# Patient Record
Sex: FEMALE | Race: ASIAN | NOT HISPANIC OR LATINO | Employment: STUDENT | URBAN - METROPOLITAN AREA
[De-identification: names, ages, dates, MRNs, and addresses within clinical notes are randomized per-mention and may not be internally consistent; named-entity substitution may affect disease eponyms.]

---

## 2020-01-14 ENCOUNTER — OFFICE VISIT (OUTPATIENT)
Dept: NEUROLOGY | Facility: CLINIC | Age: 25
End: 2020-01-14
Payer: COMMERCIAL

## 2020-01-14 VITALS
WEIGHT: 111 LBS | HEART RATE: 66 BPM | DIASTOLIC BLOOD PRESSURE: 72 MMHG | SYSTOLIC BLOOD PRESSURE: 106 MMHG | HEIGHT: 62 IN | BODY MASS INDEX: 20.43 KG/M2

## 2020-01-14 DIAGNOSIS — G43.709 CHRONIC MIGRAINE WITHOUT AURA WITHOUT STATUS MIGRAINOSUS, NOT INTRACTABLE: Primary | ICD-10-CM

## 2020-01-14 DIAGNOSIS — G44.52 NEW PERSISTENT DAILY HEADACHE: ICD-10-CM

## 2020-01-14 DIAGNOSIS — E55.9 VITAMIN D DEFICIENCY: ICD-10-CM

## 2020-01-14 PROCEDURE — 99205 OFFICE O/P NEW HI 60 MIN: CPT | Performed by: PSYCHIATRY & NEUROLOGY

## 2020-01-14 RX ORDER — SUMATRIPTAN 100 MG/1
100 TABLET, FILM COATED ORAL ONCE AS NEEDED
Qty: 10 TABLET | Refills: 1 | Status: SHIPPED | OUTPATIENT
Start: 2020-01-14

## 2020-01-14 RX ORDER — RIBOFLAVIN (VITAMIN B2) 100 MG
50 TABLET ORAL 2 TIMES DAILY
Qty: 60 TABLET | Refills: 3 | Status: SHIPPED | OUTPATIENT
Start: 2020-01-14 | End: 2020-06-04 | Stop reason: CLARIF

## 2020-01-14 RX ORDER — ERGOCALCIFEROL 1.25 MG/1
CAPSULE ORAL
COMMUNITY
Start: 2019-10-07

## 2020-01-14 RX ORDER — CALCIUM CARBONATE 300MG(750)
400 TABLET,CHEWABLE ORAL 2 TIMES DAILY
Qty: 60 TABLET | Refills: 3 | Status: SHIPPED | OUTPATIENT
Start: 2020-01-14 | End: 2020-07-30 | Stop reason: ALTCHOICE

## 2020-01-14 NOTE — ASSESSMENT & PLAN NOTE
-Treatment - will start Vitamin B2, magnesium, feverfew, butterbur extract, side effects reviewed    Continue Abortive therapy with imitrex  Will get MRI brain without contrast since she does have L sided tingling/numbness, and floaters in her eyes, both of which are new symptoms she states  Counseling -  I Counseled the patient regarding avoiding taking analgesics no more than 3 times a week to avoid rebound headaches   Avoid caffeine intake after 3pm, and no more two cups a day   I also counseled patient to avoid migraine triggers if possible  I recommended maintaining regular sleep routine and diet regimen  I also advised patient to maintain a headache diary

## 2020-01-29 ENCOUNTER — HOSPITAL ENCOUNTER (OUTPATIENT)
Dept: RADIOLOGY | Facility: HOSPITAL | Age: 25
Discharge: HOME | End: 2020-01-29
Attending: PSYCHIATRY & NEUROLOGY
Payer: COMMERCIAL

## 2020-01-29 DIAGNOSIS — G43.709 CHRONIC MIGRAINE WITHOUT AURA WITHOUT STATUS MIGRAINOSUS, NOT INTRACTABLE: ICD-10-CM

## 2020-01-29 DIAGNOSIS — G44.52 NEW PERSISTENT DAILY HEADACHE: ICD-10-CM

## 2020-01-29 PROCEDURE — 70551 MRI BRAIN STEM W/O DYE: CPT

## 2020-04-06 ENCOUNTER — TELEPHONE (OUTPATIENT)
Dept: NEUROLOGY | Facility: CLINIC | Age: 25
End: 2020-04-06

## 2020-04-08 ENCOUNTER — TRANSCRIBE ORDERS (OUTPATIENT)
Dept: NEUROLOGY | Facility: CLINIC | Age: 25
End: 2020-04-08

## 2020-04-08 ENCOUNTER — TELEMEDICINE (OUTPATIENT)
Dept: NEUROLOGY | Facility: CLINIC | Age: 25
End: 2020-04-08
Payer: COMMERCIAL

## 2020-04-08 DIAGNOSIS — G44.52 NEW PERSISTENT DAILY HEADACHE: ICD-10-CM

## 2020-04-08 DIAGNOSIS — G44.52 NEW DAILY PERSISTENT HEADACHE (NDPH): ICD-10-CM

## 2020-04-08 DIAGNOSIS — G43.709 CHRONIC MIGRAINE WITHOUT AURA, NOT INTRACTABLE, WITHOUT STATUS MIGRAINOSUS: Primary | ICD-10-CM

## 2020-04-08 DIAGNOSIS — G43.709 CHRONIC MIGRAINE WITHOUT AURA WITHOUT STATUS MIGRAINOSUS, NOT INTRACTABLE: Primary | ICD-10-CM

## 2020-04-08 PROCEDURE — 99441 PR PHYS/QHP TELEPHONE EVALUATION 5-10 MIN: CPT | Performed by: PSYCHIATRY & NEUROLOGY

## 2020-05-27 ENCOUNTER — TELEPHONE (OUTPATIENT)
Dept: NEUROLOGY | Facility: CLINIC | Age: 25
End: 2020-05-27

## 2020-05-28 ENCOUNTER — TELEPHONE (OUTPATIENT)
Dept: NEUROLOGY | Facility: CLINIC | Age: 25
End: 2020-05-28

## 2020-06-04 ENCOUNTER — TELEPHONE (OUTPATIENT)
Dept: NEUROLOGY | Facility: CLINIC | Age: 25
End: 2020-06-04

## 2020-06-04 ENCOUNTER — TELEMEDICINE (OUTPATIENT)
Dept: NEUROLOGY | Facility: CLINIC | Age: 25
End: 2020-06-04
Payer: COMMERCIAL

## 2020-06-04 DIAGNOSIS — G44.52 NEW PERSISTENT DAILY HEADACHE: ICD-10-CM

## 2020-06-04 DIAGNOSIS — G43.709 CHRONIC MIGRAINE WITHOUT AURA WITHOUT STATUS MIGRAINOSUS, NOT INTRACTABLE: Primary | ICD-10-CM

## 2020-06-04 DIAGNOSIS — G44.52 NEW DAILY PERSISTENT HEADACHE (NDPH): ICD-10-CM

## 2020-06-04 PROCEDURE — 99442 PR PHYS/QHP TELEPHONE EVALUATION 11-20 MIN: CPT | Performed by: PSYCHIATRY & NEUROLOGY

## 2020-06-04 RX ORDER — RIBOFLAVIN (VITAMIN B2) 100 MG
50 TABLET ORAL 2 TIMES DAILY
Qty: 60 TABLET | Refills: 3 | Status: SHIPPED | OUTPATIENT
Start: 2020-06-04

## 2020-06-18 ENCOUNTER — TELEPHONE (OUTPATIENT)
Dept: NEUROLOGY | Facility: CLINIC | Age: 25
End: 2020-06-18

## 2020-07-30 ENCOUNTER — OFFICE VISIT (OUTPATIENT)
Dept: NEUROLOGY | Facility: CLINIC | Age: 25
End: 2020-07-30
Payer: COMMERCIAL

## 2020-07-30 VITALS
HEIGHT: 62 IN | BODY MASS INDEX: 19.47 KG/M2 | DIASTOLIC BLOOD PRESSURE: 59 MMHG | TEMPERATURE: 98.4 F | HEART RATE: 85 BPM | SYSTOLIC BLOOD PRESSURE: 102 MMHG | WEIGHT: 105.8 LBS

## 2020-07-30 DIAGNOSIS — G43.709 CHRONIC MIGRAINE WITHOUT AURA WITHOUT STATUS MIGRAINOSUS, NOT INTRACTABLE: Primary | ICD-10-CM

## 2020-07-30 PROCEDURE — 99214 OFFICE O/P EST MOD 30 MIN: CPT | Performed by: PSYCHIATRY & NEUROLOGY

## 2020-07-30 NOTE — PROGRESS NOTES
Patient ID: Latoya Garcia is a 22 y o  female  Assessment/Plan: This is a 23 y/o  Female who is here as a follow up for chronic migraines without aura  PLAN:      Diagnoses and all orders for this visit:    Chronic migraine without aura without status migrainosus, not intractable  Preventive medications - continue with riboflavin and magnesium  Patient stopped taking vitamins for some reason because she got something in the mail about advising her to stop taking her vitamins  She did notice that her headaches are now back because she stopped taking her vitamins   -Failed previous prophylactic medications  -  none    Abortive medications - continue with imitrex PRN     Botox -not necessary at this time  Counseling / Headache management instructions-  - When patient has a moderate to severe headache, they should seek rest, initiate relaxation and apply cold compresses to the head  - Maintain regular sleep schedule  Adults need at least 7-8 hours of uninterrupted a night  - Limit over the counter medications such as Tylenol, Ibuprofen, Aleve, Excedrin  (No more than 3 times a week)  - Maintain headache diary  We discussed an YAMILE for a smart phone is "Migraine kelle"  - Limit caffeine to 1-2 cups 8 to 16 oz a day or less, none after 3pm   - Avoid dietary trigger  (aged cheese, peanuts, MSG, aspartame and nitrates)  - Patient is to have regular frequent meals to prevent headache onset  - Please drink at least 64 ounces of water a day to help remain hydrated  Follow up - 3 months  I would be happy to see the patient sooner if any new questions/concerns arise  Patient/Guardian was advised to the call the office if they have any questions and concerns in the meantime       Patient/Guardian does understand that if they have any new stroke like symptoms such as facial droop on one side, weakness/paralysis on either side, speech trouble, numbness on one side, balance issues, any vision changes, extreme dizziness or any new headache, to call 9-1-1 immediately or to proceed to the nearest ER immediately  Subjective:    HPI    This is a 21 y/o Female who is here as a follow up for chronic migraine  She does have migraines and she states that there worsened there on the left side of her head and have return but did not last long  She does have Imitrex prescribed but she only uses it on for extreme case she states  A scale of 1-10 her headaches are 4/10  Description of her pain is pressure, squeezing, pounding, throbbing, sharp  Pain is located mostly on the left temple and occasionally back of the head  On the average headaches are about 8-10 per month now  Headaches last about 30 minutes  Associated symptoms or headaches or spots before the eyes and light sensitivity and sometimes noise sensitivity  The following portions of the patient's history were reviewed and updated as appropriate:   She  has a past medical history of Ovarian cyst   She   Patient Active Problem List    Diagnosis Date Noted    New daily persistent headache (ndph) 04/08/2020    Chronic migraine without aura without status migrainosus, not intractable 01/14/2020    Vitamin D deficiency 01/14/2020    New persistent daily headache 01/14/2020     She  has no past surgical history on file  Her family history is not on file  She  reports that she has never smoked  She has never used smokeless tobacco  She reports that she does not drink alcohol or use drugs  Current Outpatient Medications   Medication Sig Dispense Refill    ergocalciferol (VITAMIN D2) 50,000 units TAKE 1 CAPSULE BY MOUTH EVERY TWO WEEKS      multivitamin (THERAGRAN) TABS Take 1 tablet by mouth daily        SUMAtriptan (IMITREX) 100 mg tablet Take 1 tablet (100 mg total) by mouth once as needed for migraine for up to 20 doses 10 tablet 1    Riboflavin (VITAMIN B-2) 100 MG TABS Take 1 tablet (100 mg total) by mouth daily (Patient not taking: Reported on 7/30/2020) 30 tablet 3    Riboflavin (VITAMIN B-2) 50 MG TABS Take 1 tablet (50 mg total) by mouth 2 (two) times a day (Patient not taking: Reported on 7/30/2020) 60 tablet 3     No current facility-administered medications for this visit  Current Outpatient Medications on File Prior to Visit   Medication Sig    ergocalciferol (VITAMIN D2) 50,000 units TAKE 1 CAPSULE BY MOUTH EVERY TWO WEEKS    multivitamin (THERAGRAN) TABS Take 1 tablet by mouth daily   SUMAtriptan (IMITREX) 100 mg tablet Take 1 tablet (100 mg total) by mouth once as needed for migraine for up to 20 doses    Riboflavin (VITAMIN B-2) 100 MG TABS Take 1 tablet (100 mg total) by mouth daily (Patient not taking: Reported on 7/30/2020)    Riboflavin (VITAMIN B-2) 50 MG TABS Take 1 tablet (50 mg total) by mouth 2 (two) times a day (Patient not taking: Reported on 7/30/2020)    [DISCONTINUED] Magnesium 400 MG TABS Take 1 tablet (400 mg total) by mouth 2 (two) times a day (Patient not taking: Reported on 7/30/2020)     No current facility-administered medications on file prior to visit  She is allergic to penicillins            Objective:    Blood pressure 102/59, pulse 85, temperature 98 4 °F (36 9 °C), temperature source Tympanic, height 5' 2" (1 575 m), weight 48 kg (105 lb 12 8 oz), not currently breastfeeding  Physical Exam  General - Patient is alert, awake, oriented to time, place and person, follows commands  Speech - no dysarthria noted, no aphasia noted  Neuro:   Cranial nerves: PERRL, EOMI, facial sensation intact, able to raise eyebrows symmetrically, cannot assess facial droop and tongue deviation due to face mask requirement  Motor: 5/5 throughout, normal tone, no pronator drift noted  Sensory - intact to soft touch throughout  Coordination - no ataxia/dysmetria noted  Gait - normal tandem walk without     ROS:  I personally reviewed ROS   Review of Systems   Constitutional: Negative  Negative for appetite change and fever  HENT: Negative  Negative for hearing loss, tinnitus, trouble swallowing and voice change  Eyes: Negative  Negative for photophobia and pain  Respiratory: Negative  Negative for shortness of breath  Cardiovascular: Negative  Negative for palpitations  Gastrointestinal: Negative  Negative for nausea and vomiting  Endocrine: Negative  Negative for cold intolerance  Genitourinary: Negative  Negative for dysuria, frequency and urgency  Musculoskeletal: Negative  Negative for myalgias and neck pain  Skin: Negative  Negative for rash  Neurological: Positive for headaches  Negative for dizziness, tremors, seizures, syncope, facial asymmetry, speech difficulty, weakness, light-headedness and numbness  Patient stated that she has two to three headaches a week now  Hematological: Negative  Does not bruise/bleed easily  Psychiatric/Behavioral: Negative  Negative for confusion, hallucinations and sleep disturbance

## 2021-04-13 DIAGNOSIS — Z23 ENCOUNTER FOR IMMUNIZATION: ICD-10-CM

## 2021-04-24 ENCOUNTER — IMMUNIZATIONS (OUTPATIENT)
Dept: FAMILY MEDICINE CLINIC | Facility: HOSPITAL | Age: 26
End: 2021-04-24

## 2021-04-24 DIAGNOSIS — Z23 ENCOUNTER FOR IMMUNIZATION: Primary | ICD-10-CM

## 2021-04-24 PROCEDURE — 0001A SARS-COV-2 / COVID-19 MRNA VACCINE (PFIZER-BIONTECH) 30 MCG: CPT

## 2021-04-24 PROCEDURE — 91300 SARS-COV-2 / COVID-19 MRNA VACCINE (PFIZER-BIONTECH) 30 MCG: CPT

## 2021-05-17 ENCOUNTER — IMMUNIZATIONS (OUTPATIENT)
Dept: FAMILY MEDICINE CLINIC | Facility: HOSPITAL | Age: 26
End: 2021-05-17

## 2021-05-17 DIAGNOSIS — Z23 ENCOUNTER FOR IMMUNIZATION: Primary | ICD-10-CM

## 2021-05-17 PROCEDURE — 0002A SARS-COV-2 / COVID-19 MRNA VACCINE (PFIZER-BIONTECH) 30 MCG: CPT

## 2021-05-17 PROCEDURE — 91300 SARS-COV-2 / COVID-19 MRNA VACCINE (PFIZER-BIONTECH) 30 MCG: CPT

## 2024-08-17 NOTE — PROGRESS NOTES
Patient ID: Mejia Tovar is a 22 y o  female  Assessment/Plan: This is a 21 y/o Female who is here as a new patient to establish care with us for migraines  She has chronic migraines with aura, and she does not smoke and does not take OCPs  She has more than 15+ headaches a month, and they last more than 4 hours and sometimes longer  PLAN:        Problem List Items Addressed This Visit        Cardiovascular and Mediastinum    Chronic migraine without aura without status migrainosus, not intractable - Primary     -Treatment - will start Vitamin B2, magnesium, feverfew, butterbur extract, side effects reviewed    Continue Abortive therapy with imitrex  Will get MRI brain without contrast since she does have L sided tingling/numbness, and floaters in her eyes, both of which are new symptoms she states  Counseling -  I Counseled the patient regarding avoiding taking analgesics no more than 3 times a week to avoid rebound headaches   Avoid caffeine intake after 3pm, and no more two cups a day   I also counseled patient to avoid migraine triggers if possible  I recommended maintaining regular sleep routine and diet regimen  I also advised patient to maintain a headache diary  Relevant Medications    Riboflavin (VITAMIN B-2) 50 MG TABS    Magnesium 400 MG TABS    Riboflavin-Magnesium-Feverfew 200-180-50 MG TABS    SUMAtriptan (IMITREX) 100 mg tablet    Other Relevant Orders    MRI brain without contrast       Other    Vitamin D deficiency    New persistent daily headache    Relevant Medications    SUMAtriptan (IMITREX) 100 mg tablet    Other Relevant Orders    MRI brain without contrast         I would like to follow up in 3 months  I would be happy to see the patient sooner if any new questions/concerns arise  Patient/Guardian was advised to the call the office if they have any questions and concerns in the meantime       Patient/Guardian does understand that if they have any new stroke like symptoms such as facial droop on one side, weakness/paralysis on either side, speech trouble, numbness on one side, balance issues, any vision changes, extreme dizziness or any new headache, to call 9-1-1 immediately or to proceed to the nearest ER immediately  Subjective:    HPI    This is a 21 y/o F who is here as a new patient to establish care with us for migraines  She is here as a new patient  Her headache started at the age of 26  Frequency of her headaches is every day worse pretty much daily  Typically her headaches last sometimes for 2 weeks and then she does not have any headaches for the next week and half to 2 weeks again  Intensity of her headache is 4 to 6/10 when she does get her headaches  They are located on the left temporal side  Description of her headache is pulsating, dull, pressure-like  She says that she has tingling/numbness on the L side of her body which is preceding the headache, sometimes it lasts for a few hours  She says that her L face is sometimes tighter/tingling as well  She does have floaters in her eyes sometimes  She states that the tingling on the left side of the tingling on the left side of her body started fairly recently  She did see her PCP who did not think much of this symptom  Associated symptoms or headaches or nausea, constipation problems, prefers a quiet dark room, lightheadedness, tingling or numbness in hands and feet  She is sensitive to light and sound  When she does have pretty severe migraine she does tend to lay down in a diet in a quiet dark room  Triggers for headache are stress, menstruation, weather changes  Rescue medications none  She has not tried Imitrex although her PCP has prescribed for her  She states lately her intensity has improved and they are not as severe as they used to be      Rescue medications - imitrex     The following portions of the patient's history were reviewed and updated as appropriate:   She has a past medical history of Ovarian cyst   She   Patient Active Problem List    Diagnosis Date Noted    Chronic migraine without aura without status migrainosus, not intractable 01/14/2020    Vitamin D deficiency 01/14/2020    New persistent daily headache 01/14/2020     She  has no past surgical history on file  Her family history is not on file  She  reports that she has never smoked  She has never used smokeless tobacco  She reports that she does not drink alcohol or use drugs  Current Outpatient Medications   Medication Sig Dispense Refill    ergocalciferol (VITAMIN D2) 50,000 units TAKE 1 CAPSULE BY MOUTH EVERY TWO WEEKS      multivitamin (THERAGRAN) TABS Take 1 tablet by mouth daily   Magnesium 400 MG TABS Take 1 tablet (400 mg total) by mouth 2 (two) times a day 60 tablet 3    Riboflavin (VITAMIN B-2) 50 MG TABS Take 1 tablet (50 mg total) by mouth 2 (two) times a day 60 tablet 3    Riboflavin-Magnesium-Feverfew 200-180-50 MG TABS Take 200 mg by mouth 2 (two) times a day 60 tablet 3    SUMAtriptan (IMITREX) 100 mg tablet Take 1 tablet (100 mg total) by mouth once as needed for migraine for up to 20 doses 10 tablet 1     No current facility-administered medications for this visit  Current Outpatient Medications on File Prior to Visit   Medication Sig    ergocalciferol (VITAMIN D2) 50,000 units TAKE 1 CAPSULE BY MOUTH EVERY TWO WEEKS    multivitamin (THERAGRAN) TABS Take 1 tablet by mouth daily  No current facility-administered medications on file prior to visit  She is allergic to penicillins       Objective:    Blood pressure 106/72, pulse 66, height 5' 2" (1 575 m), weight 50 3 kg (111 lb), not currently breastfeeding  Physical Exam  General: Patient is not in any acute/apparent distress, well nourished, well developed and cooperative  HEENT: normocephalic, atraumatic, moist membranes  Neck: supple  Heart: regular heart rate and rhythm, no murmurs, rubs and or gallops  Chest: Clear to auscultation bilaterally  Abdomen: soft and non-tender  Extremities: no edema noted   Skin: no lesions or rash  Musculosketal: no bony abnormalities    Neurologic Examination:   Mental status: alert, awake, oriented X 3 and following commands  Speech/Language: Speech is fluent without any dysarthria, no aphasia noted, can name, repeat, read and write and comprehension intact    Cranial Nerves:   CN I: smell not tested  CN II: Visual fields full to confrontation, fundus - no papilledema noted  CN III, IV, VI: Extraocular movements intact bilaterally  Pupils equal round and reactive to light bilaterally  CN V: Facial sensation is normal   CN VII: Full and symmetric facial movement  CN VIII: Hearing is normal   CN IX, X: Palate elevates symmetrically  Normal gag reflex  CN XI: Shoulder shrug strength is normal   CN XII: Tongue midline without atrophy or fasciculations  Motor:   Strength 5/5 in all 4 extremities  No pronator drift  Normal rapid alternating movements  Bulk/tone - normal   Fasiculations - none    Sensory:   Sensation intact to soft touch, vibration and pinprick in all 4 extremities  Cerebellar:   Finger-to-nose intact, normal heel to shin  Reflexes: 2+ in all 4 extremities  Pathologic reflexes - babinski reflex negative, Hoffmans reflex - negative    Gait:   Normal gait, able to tandem walk, able to tip-toe, able to walk on heels, normal arm to swing  Rhomberg sign negative    ROS:  I personally reviewed ROS   Review of Systems   Constitutional: Negative  Negative for appetite change and fever  HENT: Negative  Negative for hearing loss, tinnitus, trouble swallowing and voice change  Eyes: Negative  Negative for photophobia and pain  Respiratory: Negative  Negative for shortness of breath  Cardiovascular: Negative  Negative for palpitations  Gastrointestinal: Negative  Negative for nausea and vomiting  Endocrine: Negative    Negative for cold intolerance and heat intolerance  Genitourinary: Negative  Negative for dysuria, frequency and urgency  Musculoskeletal: Negative  Negative for myalgias and neck pain  Skin: Negative  Negative for rash  Neurological: Positive for headaches  Negative for dizziness, tremors, seizures, syncope, facial asymmetry, speech difficulty, weakness, light-headedness and numbness  Patient states that she has headaches a week and a half and then stop for a week then start again  Patient stated that this has been going on now for 5 Months now  Hematological: Negative  Does not bruise/bleed easily  Psychiatric/Behavioral: Negative  Negative for confusion, hallucinations and sleep disturbance  141.1